# Patient Record
Sex: MALE | Race: WHITE | URBAN - METROPOLITAN AREA
[De-identification: names, ages, dates, MRNs, and addresses within clinical notes are randomized per-mention and may not be internally consistent; named-entity substitution may affect disease eponyms.]

---

## 2017-06-29 ENCOUNTER — OFFICE VISIT (OUTPATIENT)
Dept: FAMILY MEDICINE | Facility: CLINIC | Age: 32
End: 2017-06-29
Payer: COMMERCIAL

## 2017-06-29 VITALS
HEIGHT: 75 IN | OXYGEN SATURATION: 99 % | WEIGHT: 269.1 LBS | BODY MASS INDEX: 33.46 KG/M2 | DIASTOLIC BLOOD PRESSURE: 82 MMHG | TEMPERATURE: 97.7 F | HEART RATE: 77 BPM | SYSTOLIC BLOOD PRESSURE: 132 MMHG

## 2017-06-29 DIAGNOSIS — H91.90 DECREASED HEARING, UNSPECIFIED LATERALITY: ICD-10-CM

## 2017-06-29 DIAGNOSIS — H61.23 BILATERAL IMPACTED CERUMEN: ICD-10-CM

## 2017-06-29 DIAGNOSIS — Z00.00 ENCOUNTER FOR ROUTINE ADULT HEALTH EXAMINATION WITHOUT ABNORMAL FINDINGS: Primary | ICD-10-CM

## 2017-06-29 DIAGNOSIS — C43.9 MELANOMA OF SKIN (H): ICD-10-CM

## 2017-06-29 DIAGNOSIS — E66.9 OBESITY, UNSPECIFIED OBESITY SEVERITY, UNSPECIFIED OBESITY TYPE: ICD-10-CM

## 2017-06-29 LAB
CHOLEST SERPL-MCNC: 185 MG/DL
GLUCOSE SERPL-MCNC: 99 MG/DL (ref 70–99)
HDLC SERPL-MCNC: 58 MG/DL
LDLC SERPL CALC-MCNC: 110 MG/DL
NONHDLC SERPL-MCNC: 127 MG/DL
TRIGL SERPL-MCNC: 85 MG/DL

## 2017-06-29 PROCEDURE — 36415 COLL VENOUS BLD VENIPUNCTURE: CPT | Performed by: FAMILY MEDICINE

## 2017-06-29 PROCEDURE — 82947 ASSAY GLUCOSE BLOOD QUANT: CPT | Performed by: FAMILY MEDICINE

## 2017-06-29 PROCEDURE — 80061 LIPID PANEL: CPT | Performed by: FAMILY MEDICINE

## 2017-06-29 PROCEDURE — 99385 PREV VISIT NEW AGE 18-39: CPT | Performed by: FAMILY MEDICINE

## 2017-06-29 ASSESSMENT — PAIN SCALES - GENERAL: PAINLEVEL: NO PAIN (0)

## 2017-06-29 NOTE — MR AVS SNAPSHOT
After Visit Summary   6/29/2017    Jamin Uriarte    MRN: [id number]           Patient Information     Date Of Birth          1985        Visit Information        Provider Department      6/29/2017 8:20 AM Anne Deng MD [facility]        Today's Diagnoses     Encounter for routine adult health examination without abnormal findings    -  1    Melanoma of skin (H)        Decreased hearing, unspecified laterality        Bilateral impacted cerumen          Care Instructions      - Consider meal planning to make it easier to eat well with your busy schedule.    - Can try over-the-counter ear drops for ear wax. May help with your hearing.      Preventive Health Recommendations  Male Ages 26 - 39    Yearly exam:             See your health care provider every year in order to  o   Review health changes.   o   Discuss preventive care.    o   Review your medicines if your doctor has prescribed any.    You should be tested each year for STDs (sexually transmitted diseases), if you re at risk.     After age 35, talk to your provider about cholesterol testing. If you are at risk for heart disease, have your cholesterol tested at least every 5 years.     If you are at risk for diabetes, you should have a diabetes test (fasting glucose).  Shots: Get a flu shot each year. Get a tetanus shot every 10 years.     Nutrition:    Eat at least 5 servings of fruits and vegetables daily.     Eat whole-grain bread, whole-wheat pasta and brown rice instead of white grains and rice.     Talk to your provider about Calcium and Vitamin D.     Lifestyle    Exercise for at least 150 minutes a week (30 minutes a day, 5 days a week). This will help you control your weight and prevent disease.     Limit alcohol to one drink per day.     No smoking.     Wear sunscreen to prevent skin cancer.     See your dentist every six months for an exam and cleaning.             Follow-ups after your visit       "  Who to contact     If you have questions or need follow up information about today's clinic visit or your schedule please contact [facility] directly at [phone].  Normal or non-critical lab and imaging results will be communicated to you by MyChart, letter or phone within 4 business days after the clinic has received the results. If you do not hear from us within 7 days, please contact the clinic through MyChart or phone. If you have a critical or abnormal lab result, we will notify you by phone as soon as possible.  Submit refill requests through [employer] or call your pharmacy and they will forward the refill request to us. Please allow 3 business days for your refill to be completed.          Additional Information About Your Visit        [employer] Information     [employer] lets you send messages to your doctor, view your test results, renew your prescriptions, schedule appointments and more. To sign up, go to [email or web address] . Click on \"Log in\" on the left side of the screen, which will take you to the Welcome page. Then click on \"Sign up Now\" on the right side of the page.     You will be asked to enter the access code listed below, as well as some personal information. Please follow the directions to create your username and password.     Your access code is: [email or web address]  Expires: 2017  9:11 AM     Your access code will  in 90 days. If you need help or a new code, please call your [place] clinic or [phone].        Care EveryWhere ID     This is your Care EveryWhere ID. This could be used by other organizations to access your [place] medical records  [id number]        Your Vitals Were     Pulse Temperature Height Pulse Oximetry BMI (Body Mass Index)       77 97.7  F (36.5  C) (Oral) 1.895 m (6' 2.61\") 99% 33.99 kg/m2        Blood Pressure from Last 3 Encounters:   17 132/82    Weight from Last 3 Encounters:   17 122.1 kg (269 lb 1.6 oz)            "   Today, you had the following     No orders found for display       Primary Care Provider    None Specified       No primary provider on file.        Equal Access to Services     AYLCE MONSON : Hadii kaye Mcgovern, chan clemons, cleofernando huamelvingeoffrey oates, trudy ritter sadiajoel lynchkaleyjose martin akhtar. So [facility] [phone].    ATENCIÓN: Si habla español, tiene a leon disposición servicios gratuitos de asistencia lingüística. Llame al [phone].    We comply with applicable federal civil rights laws and [place] laws. We do not discriminate on the basis of race, color, national origin, age, disability sex, sexual orientation or gender identity.            Thank you!     Thank you for choosing [facility]  for your care. Our goal is always to provide you with excellent care. Hearing back from our patients is one way we can continue to improve our services. Please take a few minutes to complete the written survey that you may receive in the mail after your visit with us. Thank you!             Your Updated Medication List - Protect others around you: Learn how to safely use, store and throw away your medicines at www.disposemymeds.org.      Notice  As of 6/29/2017  9:11 AM    You have not been prescribed any medications.

## 2017-06-29 NOTE — PROGRESS NOTES
SUBJECTIVE:   CC: Jamin Uriarte is an 32 year old male who presents for preventative health visit.     **New Patient - Establish Care**      Healthy Habits:    Do you get at least three servings of calcium containing foods daily (dairy, green leafy vegetables, etc.)? yes    Amount of exercise or daily activities, outside of work: 0 day(s) per week    Problems taking medications regularly No    Medication side effects: No    Have you had an eye exam in the past two years? yes    Do you see a dentist twice per year? yes    Do you have sleep apnea, excessive snoring or daytime drowsiness?no      - New patient. States it has been years since he has had a physical exam.    - History of melanoma, left medial lower leg. Diagnosed 2007 by [facility]. Currently followed annually by [facility] , last seen 2 months ago. Melanoma removed without spreading past the initial spot and without recurrence.     - History of mild asthma as child. Resolved with adulthood.    - Hearing difficulty. Had formal audiology evaluation 1 year ago, which he reports was normal Told to f/u in few years. He continues to experience difficulty hearing in loud environments.     - Decreased sleep and physical activity secondary to caring for 10-month old twin sons.    - Received TDAP July 2016 at [facility].    - Fasting today for labs.      Today's PHQ-2 Score: No flowsheet data found.    Abuse: Current or Past(Physical, Sexual or Emotional)- No  Do you feel safe in your environment - Yes    Social History   Substance Use Topics     Smoking status: Never Smoker     Smokeless tobacco: Not on file     Alcohol use Yes     The patient does not drink >3 drinks per day nor >7 drinks per week.    -  with children.      Last PSA: No results found for: PSA    Reviewed orders with patient. Reviewed health maintenance and updated orders accordingly - Yes        Patient Active Problem List   Diagnosis     Melanoma of skin (H)     Decreased  "hearing     Obesity, unspecified obesity severity, unspecified obesity type     History reviewed. No pertinent surgical history.    Social History   Substance Use Topics     Smoking status: Never Smoker     Smokeless tobacco: Not on file     Alcohol use Yes     Family History   Problem Relation Age of Onset     Melanoma Other      Other - See Comments Son      DIABETES No family hx of      Coronary Artery Disease No family hx of      Hypertension No family hx of            Reviewed and updated as needed this visit by clinical staff  Tobacco  Allergies  Meds  Med Hx  Surg Hx  Fam Hx  Soc Hx        Reviewed and updated as needed this visit by Provider            ROS: 10 point ROS neg other than the symptoms noted above in the HPI.        This document serves as a record of the services and decisions personally performed and made by Anne Deng MD. It was created on his behalf by Nicole Patricia, a trained medical scribe. The creation of this document is based the provider's statements to the medical scribe.  Nicole Patricia 12:05 PM June 29, 2017    OBJECTIVE:   /82 (BP Location: Right arm, Patient Position: Chair, Cuff Size: Adult Large)  Pulse 77  Temp 97.7  F (36.5  C) (Oral)  Ht 1.895 m (6' 2.61\")  Wt 122.1 kg (269 lb 1.6 oz)  SpO2 99%  BMI 33.99 kg/m2    EXAM:  GENERAL: healthy, alert and no distress  EYES: Eyes grossly normal to inspection, PERRL and conjunctivae and sclerae normal  HENT: Bilateral cerumen impaction (L>R), nose and mouth without ulcers or lesions  NECK: no adenopathy, no asymmetry, masses, or scars and thyroid normal to palpation  RESP: lungs clear to auscultation - no rales, rhonchi or wheezes  CV: regular rate and rhythm, normal S1 S2, no S3 or S4, no murmur, click or rub, no peripheral edema and peripheral pulses strong  ABDOMEN: soft, nontender, no hepatosplenomegaly, no masses and bowel sounds normal   (male): normal male genitalia without lesions or urethral " discharge, no hernia  MS: no gross musculoskeletal defects noted, no edema  SKIN: White silvery thickened patches seen on anterior knees bilaterally. Otherwise, no suspicious lesions or rashes  NEURO: Normal strength and tone, mentation intact and speech normal  PSYCH: mentation appears normal, affect normal/bright      ASSESSMENT/PLAN:     1. Encounter for routine adult health examination without abnormal findings  Established care.  - Lipid panel reflex to direct LDL  - Glucose    2. Melanoma of skin (H)  Surgically removed. No recurrence. Followed by Dermatology yearly. No new skin concerns.     3. Decreased hearing, unspecified laterality  Patient reports normal audiology evaluation last year. May be secondary to cerumen impaction. If hearing difficulty persists following treatment of cerumen impaction, consider repeat audiology evaluation. Reviewed otc tx of cerumen tx.     4. Bilateral impacted cerumen  Advised OTC ear drops. If persists, may follow-up for curet/wash.    5. Obesity, unspecified obesity severity, unspecified obesity type  Body mass index is 33.99 kg/(m^2).  Recent weight gain secondary to reduced physical activity. Advised to establish regular exercise program and meal plan to ensure healthier food choices throughout the week.          Patient Instructions     - Consider meal planning to make it easier to eat well with your busy schedule.    - Can try over-the-counter ear drops for ear wax. May help with your hearing.      COUNSELING:  Reviewed preventive health counseling, as reflected in patient instructions  Special attention given to:        Regular exercise       Healthy diet/nutrition       Vision screening       Hearing screening    BP Screening:   Last 3 BP Readings:    BP Readings from Last 3 Encounters:   06/29/17 132/82       The following was recommended to the patient:  Re-screen BP within a year and recommended lifestyle modifications Patient denies any history of elevated blood  "pressure, but does note recent decrease in physical activity.     reports that he has never smoked. He does not have any smokeless tobacco history on file.    Estimated body mass index is 33.99 kg/(m^2) as calculated from the following:    Height as of this encounter: 1.895 m (6' 2.61\").    Weight as of this encounter: 122.1 kg (269 lb 1.6 oz).   Weight management plan: Advised increased physical activity. Meal planning. Low carbs.    Counseling Resources:  ATP IV Guidelines  Pooled Cohorts Equation Calculator  FRAX Risk Assessment  ICSI Preventive Guidelines  Dietary Guidelines for Americans, 2010  USDA's MyPlate  ASA Prophylaxis  Lung CA Screening      The information in this document, created by a scribe for me, accurately reflects the services I personally performed and the decisions made by me. I have reviewed and approved this document for accuracy.  9:19 AM June 29, 2017    Anne Deng MD  [facility]  "

## 2017-06-29 NOTE — LETTER
[address]  [address]  [phone]    June 30, 2017      Jamin Uriarte  [address]  [place] [address]              Dear Jamin,    It was a pleasure seeing you at your recent visit. Your labs have been reviewed and are attached.     The fasting blood glucose (diabetes screening test) was negative/normal.     The cholesterol panel shows the LDL (bad cholesterol) is a little higher than ideal. No medication is needed for this, but continue to work towards getting a healthy diet and restarting exercise as you can. Plan to recheck the cholesterol in 1-2 years.       Sincerely,    Anne Deng M.D.      Results for orders placed or performed in visit on 06/29/17   Lipid panel reflex to direct LDL   Result Value Ref Range    Cholesterol 185 <200 mg/dL    Triglycerides 85 <150 mg/dL    HDL Cholesterol 58 >39 mg/dL    LDL Cholesterol Calculated 110 (H) <100 mg/dL    Non HDL Cholesterol 127 <130 mg/dL   Glucose   Result Value Ref Range    Glucose 99 70 - 99 mg/dL

## 2017-06-29 NOTE — PATIENT INSTRUCTIONS
- Consider meal planning to make it easier to eat well with your busy schedule.    - Can try over-the-counter ear drops for ear wax. May help with your hearing.      Preventive Health Recommendations  Male Ages 26 - 39    Yearly exam:             See your health care provider every year in order to  o   Review health changes.   o   Discuss preventive care.    o   Review your medicines if your doctor has prescribed any.    You should be tested each year for STDs (sexually transmitted diseases), if you re at risk.     After age 35, talk to your provider about cholesterol testing. If you are at risk for heart disease, have your cholesterol tested at least every 5 years.     If you are at risk for diabetes, you should have a diabetes test (fasting glucose).  Shots: Get a flu shot each year. Get a tetanus shot every 10 years.     Nutrition:    Eat at least 5 servings of fruits and vegetables daily.     Eat whole-grain bread, whole-wheat pasta and brown rice instead of white grains and rice.     Talk to your provider about Calcium and Vitamin D.     Lifestyle    Exercise for at least 150 minutes a week (30 minutes a day, 5 days a week). This will help you control your weight and prevent disease.     Limit alcohol to one drink per day.     No smoking.     Wear sunscreen to prevent skin cancer.     See your dentist every six months for an exam and cleaning.